# Patient Record
Sex: FEMALE | Race: BLACK OR AFRICAN AMERICAN | NOT HISPANIC OR LATINO | Employment: STUDENT | URBAN - METROPOLITAN AREA
[De-identification: names, ages, dates, MRNs, and addresses within clinical notes are randomized per-mention and may not be internally consistent; named-entity substitution may affect disease eponyms.]

---
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
## 2024-01-16 ENCOUNTER — TELEPHONE (OUTPATIENT)
Dept: OPHTHALMOLOGY | Facility: CLINIC | Age: 19
End: 2024-01-16
Payer: MEDICAID

## 2024-01-16 NOTE — TELEPHONE ENCOUNTER
----- Message from GIANNA Pascual sent at 1/16/2024  4:39 PM CST -----  Regarding: FW: Appt Request  Contact: Veda    ----- Message -----  From: Janine Brice  Sent: 1/16/2024   1:14 PM CST  To: [facility] Staff  Subject: Appt Request                                     Patients mother called in regards to scheduling a follow up appt.     Please call back to further [phone]

## 2024-02-07 ENCOUNTER — OFFICE VISIT (OUTPATIENT)
Dept: OPHTHALMOLOGY | Facility: CLINIC | Age: 19
End: 2024-02-07
Payer: MEDICAID

## 2024-02-07 DIAGNOSIS — H18.609 KERATOCONUS, UNSPECIFIED LATERALITY: ICD-10-CM

## 2024-02-07 DIAGNOSIS — T86.8409 REJECTION OF CORNEAL GRAFT: Primary | ICD-10-CM

## 2024-02-07 DIAGNOSIS — Z94.7 STATUS POST CORNEAL TRANSPLANT: ICD-10-CM

## 2024-02-07 PROCEDURE — 92014 COMPRE OPH EXAM EST PT 1/>: CPT | Mod: S$PBB,,, | Performed by: OPHTHALMOLOGY

## 2024-02-07 PROCEDURE — 99999 PR PBB SHADOW E&M-EST. PATIENT-LVL II: CPT | Mod: PBBFAC,,, | Performed by: OPHTHALMOLOGY

## 2024-02-07 PROCEDURE — 99212 OFFICE O/P EST SF 10 MIN: CPT | Mod: PBBFAC | Performed by: OPHTHALMOLOGY

## 2024-02-07 PROCEDURE — 1160F RVW MEDS BY RX/DR IN RCRD: CPT | Mod: CPTII,,, | Performed by: OPHTHALMOLOGY

## 2024-02-07 PROCEDURE — 1159F MED LIST DOCD IN RCRD: CPT | Mod: CPTII,,, | Performed by: OPHTHALMOLOGY

## 2024-02-07 RX ORDER — PREDNISOLONE ACETATE 10 MG/ML
1 SUSPENSION/ DROPS OPHTHALMIC 4 TIMES DAILY
Qty: 10 ML | Refills: 6 | Status: SHIPPED | OUTPATIENT
Start: 2024-02-07 | End: 2024-02-17

## 2024-02-07 NOTE — PROGRESS NOTES
HPI    DLS: 03/15/2023  PKP OU   Pt has no complaints   Using PF QD OU     Last edited by Grace Mandel MA on 2/7/2024  3:01 PM.            Assessment /Plan     For exam results, see Encounter Report.    Rejection of corneal graft    Keratoconus, unspecified laterality    Status post corneal transplant      PKP OD with rejection, improved, PF qid (NOT QD)  PKP OS clear  Urged compliance

## 2024-03-20 ENCOUNTER — TELEPHONE (OUTPATIENT)
Dept: OPHTHALMOLOGY | Facility: CLINIC | Age: 19
End: 2024-03-20
Payer: MEDICAID

## 2024-03-20 NOTE — TELEPHONE ENCOUNTER
----- Message from Michelle Whitfield sent at 3/20/2024  1:49 PM CDT -----  Consult/Advisory    Name Of Caller: Heri       Contact Preference:[phone]    Nature of call: Ptn called to reschedule nothing is showing avail

## 2024-07-10 ENCOUNTER — OFFICE VISIT (OUTPATIENT)
Dept: OPHTHALMOLOGY | Facility: CLINIC | Age: 19
End: 2024-07-10
Attending: OPHTHALMOLOGY
Payer: MEDICAID

## 2024-07-10 DIAGNOSIS — T86.8409 REJECTION OF CORNEAL GRAFT: ICD-10-CM

## 2024-07-10 DIAGNOSIS — Z94.7 STATUS POST CORNEAL TRANSPLANT: ICD-10-CM

## 2024-07-10 DIAGNOSIS — H18.623 KERATOCONUS, UNSTABLE, BILATERAL: Primary | ICD-10-CM

## 2024-07-10 PROCEDURE — 1160F RVW MEDS BY RX/DR IN RCRD: CPT | Mod: CPTII,,, | Performed by: OPHTHALMOLOGY

## 2024-07-10 PROCEDURE — 99213 OFFICE O/P EST LOW 20 MIN: CPT | Mod: PBBFAC | Performed by: OPHTHALMOLOGY

## 2024-07-10 PROCEDURE — 1159F MED LIST DOCD IN RCRD: CPT | Mod: CPTII,,, | Performed by: OPHTHALMOLOGY

## 2024-07-10 PROCEDURE — 99999 PR PBB SHADOW E&M-EST. PATIENT-LVL III: CPT | Mod: PBBFAC,,, | Performed by: OPHTHALMOLOGY

## 2024-07-10 PROCEDURE — 92014 COMPRE OPH EXAM EST PT 1/>: CPT | Mod: S$PBB,,, | Performed by: OPHTHALMOLOGY

## 2024-07-10 RX ORDER — NORGESTIMATE AND ETHINYL ESTRADIOL 0.25-0.035
1 KIT ORAL
COMMUNITY

## 2024-07-10 RX ORDER — ALBUTEROL SULFATE 90 UG/1
AEROSOL, METERED RESPIRATORY (INHALATION)
COMMUNITY

## 2024-07-10 NOTE — PROGRESS NOTES
HPI     Follow-up            Comments: PKP OU, with rejection OD          Comments    VA stable ou, no pain ou and denies floaters or flashes ou.    Pred qid OD- using in both eyes          Last edited by Ashli Moore on 7/10/2024  1:38 PM.            Assessment /Plan     For exam results, see Encounter Report.    Keratoconus, unstable, bilateral    Status post corneal transplant    Rejection of corneal graft      PKP OD 7/21  PKP OS 1/21    Rejection OD 3.23 improved with PF    Cont PF qid OU, IOP good (mid teens)

## 2024-10-09 ENCOUNTER — OFFICE VISIT (OUTPATIENT)
Dept: OPHTHALMOLOGY | Facility: CLINIC | Age: 19
End: 2024-10-09
Attending: OPHTHALMOLOGY
Payer: MEDICAID

## 2024-10-09 DIAGNOSIS — T86.8409 REJECTION OF CORNEAL GRAFT: ICD-10-CM

## 2024-10-09 DIAGNOSIS — H18.603 KERATOCONUS OF BOTH EYES: Primary | ICD-10-CM

## 2024-10-09 PROCEDURE — 99999 PR PBB SHADOW E&M-EST. PATIENT-LVL II: CPT | Mod: PBBFAC,,, | Performed by: OPHTHALMOLOGY

## 2024-10-09 PROCEDURE — 99212 OFFICE O/P EST SF 10 MIN: CPT | Mod: PBBFAC | Performed by: OPHTHALMOLOGY

## 2024-10-09 PROCEDURE — 1160F RVW MEDS BY RX/DR IN RCRD: CPT | Mod: CPTII,,, | Performed by: OPHTHALMOLOGY

## 2024-10-09 PROCEDURE — 92014 COMPRE OPH EXAM EST PT 1/>: CPT | Mod: S$PBB,,, | Performed by: OPHTHALMOLOGY

## 2024-10-09 PROCEDURE — 1159F MED LIST DOCD IN RCRD: CPT | Mod: CPTII,,, | Performed by: OPHTHALMOLOGY

## 2024-10-09 RX ORDER — PREDNISOLONE ACETATE 10 MG/ML
1 SUSPENSION/ DROPS OPHTHALMIC 4 TIMES DAILY
Qty: 10 ML | Refills: 6 | Status: SHIPPED | OUTPATIENT
Start: 2024-10-09 | End: 2024-10-19

## 2024-10-09 NOTE — PROGRESS NOTES
HPI    Patient is here today for IOP check. No visual changes at this time. No   pain, but occasional itching due to allergies.     PF QID OU  Last edited by Sally Rodrigues on 10/9/2024  2:48 PM.            Assessment /Plan     For exam results, see Encounter Report.    Keratoconus of both eyes    Rejection of corneal graft        PKP OD 7/21  PKP OS 1/21    Rejection OD 3.23 improved with PF    Cont PF qid OU, IOP good (mid teens)

## 2025-08-13 ENCOUNTER — OFFICE VISIT (OUTPATIENT)
Dept: OPHTHALMOLOGY | Facility: CLINIC | Age: 20
End: 2025-08-13
Attending: OPHTHALMOLOGY
Payer: MEDICAID

## 2025-08-13 DIAGNOSIS — Z94.7 STATUS POST CORNEAL TRANSPLANT: Primary | ICD-10-CM

## 2025-08-13 DIAGNOSIS — H18.603 KERATOCONUS OF BOTH EYES: ICD-10-CM

## 2025-08-13 PROCEDURE — 1159F MED LIST DOCD IN RCRD: CPT | Mod: CPTII,,, | Performed by: OPHTHALMOLOGY

## 2025-08-13 PROCEDURE — 92014 COMPRE OPH EXAM EST PT 1/>: CPT | Mod: S$PBB,,, | Performed by: OPHTHALMOLOGY

## 2025-08-13 PROCEDURE — 99999 PR PBB SHADOW E&M-EST. PATIENT-LVL II: CPT | Mod: PBBFAC,,, | Performed by: OPHTHALMOLOGY

## 2025-08-13 PROCEDURE — 1160F RVW MEDS BY RX/DR IN RCRD: CPT | Mod: CPTII,,, | Performed by: OPHTHALMOLOGY

## 2025-08-13 PROCEDURE — 99212 OFFICE O/P EST SF 10 MIN: CPT | Mod: PBBFAC | Performed by: OPHTHALMOLOGY

## (undated) DEVICE — SUT NYLON HGL5 DA 10/0 12IN

## (undated) DEVICE — Device

## (undated) DEVICE — SHIELD EYE PLASTIC 3100G

## (undated) DEVICE — KNIFE OPHTHALMIC 15 DEG

## (undated) DEVICE — DRESSING EYE OVAL LF

## (undated) DEVICE — PUNCH VAC DONOR CORNEA SZ 8.5

## (undated) DEVICE — NDL HYPO A BEVEL 30X1/2

## (undated) DEVICE — PUNCH BARRON VACUUM 8.50M

## (undated) DEVICE — TAPE SURG MICROPORE 2 SGL USE

## (undated) DEVICE — TREPHINE RADIAL VACUUM 8.25

## (undated) DEVICE — NDL RETROBULAR AKTKINSON 23G

## (undated) DEVICE — SYR 3CC 21 X 1 LUER LOCK

## (undated) DEVICE — SYR 10CC LUER LOCK

## (undated) DEVICE — NDL 27G X 1 1/4

## (undated) DEVICE — SUT ETHILON 10/0 CS160-6

## (undated) DEVICE — PAD EYE OVAL CNTOUR 1.62X2.62

## (undated) DEVICE — CANNULA ANTERIOR CHAMBER 30G

## (undated) DEVICE — CANNULA IRR ANTERIOR 27GX4MM

## (undated) DEVICE — ENDOSERTER CRNL ENDOTHM INSTR

## (undated) DEVICE — GLOVE BIOGEL SKINSENSE PI 7.5

## (undated) DEVICE — SOL BETADINE 5%

## (undated) DEVICE — SKIN MARKER DEVON 160

## (undated) DEVICE — TANK GAS ISPAN 125GR FOR SF6